# Patient Record
Sex: MALE | Race: WHITE | NOT HISPANIC OR LATINO | Employment: FULL TIME | URBAN - METROPOLITAN AREA
[De-identification: names, ages, dates, MRNs, and addresses within clinical notes are randomized per-mention and may not be internally consistent; named-entity substitution may affect disease eponyms.]

---

## 2023-09-21 ENCOUNTER — HOSPITAL ENCOUNTER (OUTPATIENT)
Dept: DATA CONVERSION | Facility: HOSPITAL | Age: 48
Discharge: HOME | End: 2023-09-21

## 2023-09-21 DIAGNOSIS — E66.9 OBESITY, UNSPECIFIED: ICD-10-CM

## 2023-09-21 DIAGNOSIS — Z12.5 ENCOUNTER FOR SCREENING FOR MALIGNANT NEOPLASM OF PROSTATE: ICD-10-CM

## 2023-09-21 DIAGNOSIS — Z00.00 ENCOUNTER FOR GENERAL ADULT MEDICAL EXAMINATION WITHOUT ABNORMAL FINDINGS: ICD-10-CM

## 2023-09-21 LAB
ALBUMIN SERPL-MCNC: 4.6 GM/DL (ref 3.5–5)
ALBUMIN/GLOB SERPL: 2 RATIO (ref 1.5–3)
ALP BLD-CCNC: 67 U/L (ref 35–125)
ALT SERPL-CCNC: 29 U/L (ref 5–40)
ANION GAP SERPL CALCULATED.3IONS-SCNC: 14 MMOL/L (ref 0–19)
APPEARANCE PLAS: ABNORMAL
AST SERPL-CCNC: 23 U/L (ref 5–40)
BASOPHILS # BLD AUTO: 0.02 K/UL (ref 0–0.22)
BASOPHILS NFR BLD AUTO: 0.3 % (ref 0–1)
BILIRUB SERPL-MCNC: 0.6 MG/DL (ref 0.1–1.2)
BILIRUB UR QL STRIP.AUTO: NEGATIVE
BUN SERPL-MCNC: 19 MG/DL (ref 8–25)
BUN/CREAT SERPL: 19 RATIO (ref 8–21)
CALCIUM SERPL-MCNC: 9.6 MG/DL (ref 8.5–10.4)
CHLORIDE SERPL-SCNC: 104 MMOL/L (ref 97–107)
CHOLEST SERPL-MCNC: 189 MG/DL (ref 133–200)
CHOLEST/HDLC SERPL: 5.1 RATIO
CLARITY UR: CLEAR
CO2 SERPL-SCNC: 23 MMOL/L (ref 24–31)
COLOR SPUN FLD: ABNORMAL
COLOR UR: NORMAL
CREAT SERPL-MCNC: 1 MG/DL (ref 0.4–1.6)
DEPRECATED RDW RBC AUTO: 39.9 FL (ref 37–54)
DIFFERENTIAL METHOD BLD: NORMAL
EOSINOPHIL # BLD AUTO: 0.12 K/UL (ref 0–0.45)
EOSINOPHIL NFR BLD: 1.9 % (ref 0–3)
ERYTHROCYTE [DISTWIDTH] IN BLOOD BY AUTOMATED COUNT: 11.9 % (ref 11.7–15)
FASTING STATUS PATIENT QL REPORTED: ABNORMAL
GFR SERPL CREATININE-BSD FRML MDRD: 92 ML/MIN/1.73 M2
GLOBULIN SER-MCNC: 2.3 G/DL (ref 1.9–3.7)
GLUCOSE SERPL-MCNC: 85 MG/DL (ref 65–99)
GLUCOSE UR STRIP.AUTO-MCNC: NEGATIVE MG/DL
HCT VFR BLD AUTO: 42 % (ref 41–50)
HDLC SERPL-MCNC: 37 MG/DL
HGB BLD-MCNC: 14.2 GM/DL (ref 13.5–16.5)
HGB UR QL: NEGATIVE
IMM GRANULOCYTES # BLD AUTO: 0.03 K/UL (ref 0–0.1)
KETONES UR QL STRIP.AUTO: NEGATIVE
LDLC SERPL CALC-MCNC: 123 MG/DL (ref 65–130)
LEUKOCYTE ESTERASE UR QL STRIP.AUTO: NEGATIVE
LYMPHOCYTES # BLD AUTO: 2.36 K/UL (ref 1.2–3.2)
LYMPHOCYTES NFR BLD MANUAL: 37.9 % (ref 20–40)
MCH RBC QN AUTO: 31.1 PG (ref 26–34)
MCHC RBC AUTO-ENTMCNC: 33.8 % (ref 31–37)
MCV RBC AUTO: 92.1 FL (ref 80–100)
MONOCYTES # BLD AUTO: 0.43 K/UL (ref 0–0.8)
MONOCYTES NFR BLD MANUAL: 6.9 % (ref 0–8)
NEUTROPHILS # BLD AUTO: 3.27 K/UL
NEUTROPHILS # BLD AUTO: 3.27 K/UL (ref 1.8–7.7)
NEUTROPHILS.IMMATURE NFR BLD: 0.5 % (ref 0–1)
NEUTS SEG NFR BLD: 52.5 % (ref 50–70)
NITRITE UR QL STRIP.AUTO: NEGATIVE
NRBC BLD-RTO: 0 /100 WBC
PH UR STRIP.AUTO: 5.5 [PH] (ref 4.6–8)
PLATELET # BLD AUTO: 256 K/UL (ref 150–450)
PMV BLD AUTO: 10.1 CU (ref 7–12.6)
POTASSIUM SERPL-SCNC: 4.1 MMOL/L (ref 3.4–5.1)
PROT SERPL-MCNC: 6.9 G/DL (ref 5.9–7.9)
PROT UR STRIP.AUTO-MCNC: NEGATIVE MG/DL
PSA SERPL-MCNC: 0.3 NG/ML (ref 0–4.1)
RBC # BLD AUTO: 4.56 M/UL (ref 4.5–5.5)
REFLEX MICROSCOPIC (UA): NORMAL
SODIUM SERPL-SCNC: 141 MMOL/L (ref 133–145)
SP GR UR STRIP.AUTO: 1.02 (ref 1–1.03)
TRIGL SERPL-MCNC: 143 MG/DL (ref 40–150)
UROBILINOGEN UR QL STRIP.AUTO: NORMAL MG/DL (ref 0–1)
WBC # BLD AUTO: 6.2 K/UL (ref 4.5–11)

## 2024-07-22 ENCOUNTER — OFFICE VISIT (OUTPATIENT)
Dept: PRIMARY CARE | Facility: CLINIC | Age: 49
End: 2024-07-22
Payer: COMMERCIAL

## 2024-07-22 VITALS
HEIGHT: 73 IN | SYSTOLIC BLOOD PRESSURE: 120 MMHG | OXYGEN SATURATION: 99 % | HEART RATE: 70 BPM | WEIGHT: 217 LBS | DIASTOLIC BLOOD PRESSURE: 80 MMHG | BODY MASS INDEX: 28.76 KG/M2

## 2024-07-22 DIAGNOSIS — R21 RASH AND OTHER NONSPECIFIC SKIN ERUPTION: Primary | ICD-10-CM

## 2024-07-22 PROCEDURE — 99213 OFFICE O/P EST LOW 20 MIN: CPT

## 2024-07-22 PROCEDURE — 3008F BODY MASS INDEX DOCD: CPT

## 2024-07-22 PROCEDURE — 1036F TOBACCO NON-USER: CPT

## 2024-07-22 RX ORDER — CETIRIZINE HYDROCHLORIDE 10 MG/1
10 TABLET ORAL DAILY
Qty: 30 TABLET | Refills: 0 | Status: SHIPPED | OUTPATIENT
Start: 2024-07-22 | End: 2024-08-21

## 2024-07-22 RX ORDER — PREDNISONE 20 MG/1
40 TABLET ORAL DAILY
Qty: 10 TABLET | Refills: 0 | Status: SHIPPED | OUTPATIENT
Start: 2024-07-22 | End: 2024-07-27

## 2024-07-22 ASSESSMENT — PATIENT HEALTH QUESTIONNAIRE - PHQ9
2. FEELING DOWN, DEPRESSED OR HOPELESS: NOT AT ALL
1. LITTLE INTEREST OR PLEASURE IN DOING THINGS: NOT AT ALL
SUM OF ALL RESPONSES TO PHQ9 QUESTIONS 1 AND 2: 0

## 2024-07-22 ASSESSMENT — ENCOUNTER SYMPTOMS
COUGH: 0
DIZZINESS: 0
LIGHT-HEADEDNESS: 0
TROUBLE SWALLOWING: 0
FEVER: 0
SHORTNESS OF BREATH: 0
CHILLS: 0
VOMITING: 0
NAUSEA: 0

## 2024-07-22 ASSESSMENT — LIFESTYLE VARIABLES
HOW OFTEN DO YOU HAVE A DRINK CONTAINING ALCOHOL: NEVER
AUDIT-C TOTAL SCORE: 0
HOW MANY STANDARD DRINKS CONTAINING ALCOHOL DO YOU HAVE ON A TYPICAL DAY: PATIENT DOES NOT DRINK
SKIP TO QUESTIONS 9-10: 1
HOW OFTEN DO YOU HAVE SIX OR MORE DRINKS ON ONE OCCASION: NEVER

## 2024-07-22 ASSESSMENT — PAIN SCALES - GENERAL: PAINLEVEL: 0-NO PAIN

## 2024-07-22 ASSESSMENT — COLUMBIA-SUICIDE SEVERITY RATING SCALE - C-SSRS: 1. IN THE PAST MONTH, HAVE YOU WISHED YOU WERE DEAD OR WISHED YOU COULD GO TO SLEEP AND NOT WAKE UP?: NO

## 2024-07-22 NOTE — PROGRESS NOTES
"Subjective   Patient ID: Balwinder Tomlinson is a 49 y.o. male who presents for Rash (Legs,face and arms /X 2 days /Very itchy /No burning /It is red /Warm to the touch ).    Rash  This is a new problem. The current episode started in the past 7 days. The problem has been gradually worsening since onset. The rash is diffuse. The rash is characterized by itchiness. He was exposed to nothing. Pertinent negatives include no cough, fever, shortness of breath or vomiting. Past treatments include antibiotic cream. The treatment provided mild relief. His past medical history is significant for allergies.   Reports feeling a pinch on his right upper arm. Area became red, swollen and itchy, has since spread.     Review of Systems   Constitutional:  Negative for chills and fever.   HENT:  Negative for sneezing and trouble swallowing.    Respiratory:  Negative for cough and shortness of breath.    Cardiovascular:  Negative for chest pain.   Gastrointestinal:  Negative for nausea and vomiting.   Skin:  Positive for rash.   Neurological:  Negative for dizziness and light-headedness.     Objective   /80   Pulse 70   Ht 1.854 m (6' 1\")   Wt 98.4 kg (217 lb)   SpO2 99%   BMI 28.63 kg/m²     Physical Exam  Vitals and nursing note reviewed.   Constitutional:       General: He is not in acute distress.  HENT:      Mouth/Throat:      Mouth: Mucous membranes are moist.      Pharynx: Oropharynx is clear.   Eyes:      Extraocular Movements: Extraocular movements intact.      Conjunctiva/sclera: Conjunctivae normal.   Cardiovascular:      Rate and Rhythm: Normal rate and regular rhythm.   Pulmonary:      Effort: Pulmonary effort is normal.      Breath sounds: Normal breath sounds.   Musculoskeletal:      Cervical back: Neck supple.   Skin:     Findings: Rash present. Rash is urticarial (Diffuse).          Neurological:      General: No focal deficit present.      Mental Status: He is alert.   Psychiatric:         Mood and Affect: " Mood normal.         Assessment/Plan   Problem List Items Addressed This Visit    None  Visit Diagnoses         Codes    Rash and other nonspecific skin eruption    -  Primary  Acute. Suspect insect bite with systemic inflammatory/allergic response. Low suspicion for tick bite/Lyme disease at this time.   Prednisone as directed. Risks and benefits of medication discussed and prescribed.   May take Zyrtec/Benadryl as needed for itching. Keep affected area clean and dry, may apply warm compresses, antibiotic ointment to affected area.   Follow up if symptoms do not improve within 48-72 hours. Monitor for signs of infection, bullseye rash. Go to ER if condition worsens or becomes life-threatening.  R21    Relevant Medications    predniSONE (Deltasone) 20 mg tablet    cetirizine (ZyrTEC) 10 mg tablet

## 2024-09-25 ENCOUNTER — TELEPHONE (OUTPATIENT)
Dept: PRIMARY CARE | Facility: CLINIC | Age: 49
End: 2024-09-25
Payer: COMMERCIAL

## 2024-09-25 DIAGNOSIS — Z00.00 ROUTINE MEDICAL EXAM: ICD-10-CM

## 2024-09-25 DIAGNOSIS — Z11.59 NEED FOR HEPATITIS C SCREENING TEST: ICD-10-CM

## 2024-09-25 DIAGNOSIS — Z11.4 ENCOUNTER FOR SCREENING FOR HIV: ICD-10-CM

## 2024-09-27 ENCOUNTER — LAB (OUTPATIENT)
Dept: LAB | Facility: LAB | Age: 49
End: 2024-09-27
Payer: COMMERCIAL

## 2024-09-27 DIAGNOSIS — Z00.00 ROUTINE MEDICAL EXAM: ICD-10-CM

## 2024-09-27 DIAGNOSIS — Z11.4 ENCOUNTER FOR SCREENING FOR HIV: ICD-10-CM

## 2024-09-27 DIAGNOSIS — Z11.59 NEED FOR HEPATITIS C SCREENING TEST: ICD-10-CM

## 2024-09-27 LAB
ALBUMIN SERPL BCP-MCNC: 4.6 G/DL (ref 3.4–5)
ALP SERPL-CCNC: 54 U/L (ref 33–120)
ALT SERPL W P-5'-P-CCNC: 24 U/L (ref 10–52)
ANION GAP SERPL CALCULATED.3IONS-SCNC: 9 MMOL/L (ref 10–20)
APPEARANCE UR: CLEAR
AST SERPL W P-5'-P-CCNC: 19 U/L (ref 9–39)
BASOPHILS # BLD AUTO: 0.04 X10*3/UL (ref 0–0.1)
BASOPHILS NFR BLD AUTO: 0.7 %
BILIRUB SERPL-MCNC: 0.7 MG/DL (ref 0–1.2)
BILIRUB UR STRIP.AUTO-MCNC: NEGATIVE MG/DL
BUN SERPL-MCNC: 19 MG/DL (ref 6–23)
CALCIUM SERPL-MCNC: 9.4 MG/DL (ref 8.6–10.3)
CHLORIDE SERPL-SCNC: 103 MMOL/L (ref 98–107)
CHOLEST SERPL-MCNC: 196 MG/DL (ref 0–199)
CHOLEST/HDLC SERPL: 4.5 {RATIO}
CO2 SERPL-SCNC: 30 MMOL/L (ref 21–32)
COLOR UR: NORMAL
CREAT SERPL-MCNC: 0.84 MG/DL (ref 0.5–1.3)
EGFRCR SERPLBLD CKD-EPI 2021: >90 ML/MIN/1.73M*2
EOSINOPHIL # BLD AUTO: 0.19 X10*3/UL (ref 0–0.7)
EOSINOPHIL NFR BLD AUTO: 3.2 %
ERYTHROCYTE [DISTWIDTH] IN BLOOD BY AUTOMATED COUNT: 12 % (ref 11.5–14.5)
GLUCOSE SERPL-MCNC: 84 MG/DL (ref 74–99)
GLUCOSE UR STRIP.AUTO-MCNC: NORMAL MG/DL
HCT VFR BLD AUTO: 41.3 % (ref 41–52)
HCV AB SER QL: NONREACTIVE
HDLC SERPL-MCNC: 43.8 MG/DL
HGB BLD-MCNC: 14.1 G/DL (ref 13.5–17.5)
HIV 1+2 AB+HIV1 P24 AG SERPL QL IA: NONREACTIVE
IMM GRANULOCYTES # BLD AUTO: 0.01 X10*3/UL (ref 0–0.7)
IMM GRANULOCYTES NFR BLD AUTO: 0.2 % (ref 0–0.9)
KETONES UR STRIP.AUTO-MCNC: NEGATIVE MG/DL
LDLC SERPL CALC-MCNC: 125 MG/DL
LEUKOCYTE ESTERASE UR QL STRIP.AUTO: NEGATIVE
LYMPHOCYTES # BLD AUTO: 2.18 X10*3/UL (ref 1.2–4.8)
LYMPHOCYTES NFR BLD AUTO: 36.5 %
MCH RBC QN AUTO: 31.3 PG (ref 26–34)
MCHC RBC AUTO-ENTMCNC: 34.1 G/DL (ref 32–36)
MCV RBC AUTO: 92 FL (ref 80–100)
MONOCYTES # BLD AUTO: 0.41 X10*3/UL (ref 0.1–1)
MONOCYTES NFR BLD AUTO: 6.9 %
NEUTROPHILS # BLD AUTO: 3.14 X10*3/UL (ref 1.2–7.7)
NEUTROPHILS NFR BLD AUTO: 52.5 %
NITRITE UR QL STRIP.AUTO: NEGATIVE
NON HDL CHOLESTEROL: 152 MG/DL (ref 0–149)
NRBC BLD-RTO: 0 /100 WBCS (ref 0–0)
PH UR STRIP.AUTO: 6.5 [PH]
PLATELET # BLD AUTO: 238 X10*3/UL (ref 150–450)
POTASSIUM SERPL-SCNC: 4.1 MMOL/L (ref 3.5–5.3)
PROT SERPL-MCNC: 6.6 G/DL (ref 6.4–8.2)
PROT UR STRIP.AUTO-MCNC: NEGATIVE MG/DL
RBC # BLD AUTO: 4.51 X10*6/UL (ref 4.5–5.9)
RBC # UR STRIP.AUTO: NEGATIVE /UL
SODIUM SERPL-SCNC: 138 MMOL/L (ref 136–145)
SP GR UR STRIP.AUTO: 1.02
TRIGL SERPL-MCNC: 134 MG/DL (ref 0–149)
UROBILINOGEN UR STRIP.AUTO-MCNC: NORMAL MG/DL
VLDL: 27 MG/DL (ref 0–40)
WBC # BLD AUTO: 6 X10*3/UL (ref 4.4–11.3)

## 2024-09-27 PROCEDURE — 85025 COMPLETE CBC W/AUTO DIFF WBC: CPT

## 2024-09-27 PROCEDURE — 86803 HEPATITIS C AB TEST: CPT

## 2024-09-27 PROCEDURE — 80053 COMPREHEN METABOLIC PANEL: CPT

## 2024-09-27 PROCEDURE — 81003 URINALYSIS AUTO W/O SCOPE: CPT

## 2024-09-27 PROCEDURE — 80061 LIPID PANEL: CPT

## 2024-09-27 PROCEDURE — 87389 HIV-1 AG W/HIV-1&-2 AB AG IA: CPT

## 2024-09-27 PROCEDURE — 36415 COLL VENOUS BLD VENIPUNCTURE: CPT

## 2024-10-04 ENCOUNTER — APPOINTMENT (OUTPATIENT)
Dept: PRIMARY CARE | Facility: CLINIC | Age: 49
End: 2024-10-04

## 2024-10-04 ENCOUNTER — TELEPHONE (OUTPATIENT)
Dept: PRIMARY CARE | Facility: CLINIC | Age: 49
End: 2024-10-04

## 2024-10-04 VITALS
WEIGHT: 219 LBS | HEIGHT: 73 IN | DIASTOLIC BLOOD PRESSURE: 83 MMHG | HEART RATE: 67 BPM | TEMPERATURE: 97.6 F | OXYGEN SATURATION: 96 % | SYSTOLIC BLOOD PRESSURE: 123 MMHG | BODY MASS INDEX: 29.03 KG/M2

## 2024-10-04 DIAGNOSIS — Z00.00 WELL ADULT EXAM: ICD-10-CM

## 2024-10-04 DIAGNOSIS — S76.311A STRAIN OF RIGHT HAMSTRING MUSCLE, INITIAL ENCOUNTER: ICD-10-CM

## 2024-10-04 DIAGNOSIS — Z00.00 WELL ADULT EXAM: Primary | ICD-10-CM

## 2024-10-04 PROBLEM — R51.9 HEADACHE: Status: RESOLVED | Noted: 2019-10-07 | Resolved: 2024-10-04

## 2024-10-04 PROBLEM — E66.9 OBESITY: Status: RESOLVED | Noted: 2021-03-26 | Resolved: 2024-10-04

## 2024-10-04 PROBLEM — R53.83 FATIGUE: Status: RESOLVED | Noted: 2021-03-26 | Resolved: 2024-10-04

## 2024-10-04 PROBLEM — Z86.39 H/O: OBESITY: Status: ACTIVE | Noted: 2019-01-02

## 2024-10-04 PROBLEM — J01.00 ACUTE MAXILLARY SINUSITIS: Status: RESOLVED | Noted: 2019-10-07 | Resolved: 2024-10-04

## 2024-10-04 PROCEDURE — 3008F BODY MASS INDEX DOCD: CPT | Performed by: FAMILY MEDICINE

## 2024-10-04 PROCEDURE — 99396 PREV VISIT EST AGE 40-64: CPT | Performed by: FAMILY MEDICINE

## 2024-10-04 ASSESSMENT — COLUMBIA-SUICIDE SEVERITY RATING SCALE - C-SSRS
1. IN THE PAST MONTH, HAVE YOU WISHED YOU WERE DEAD OR WISHED YOU COULD GO TO SLEEP AND NOT WAKE UP?: NO
6. HAVE YOU EVER DONE ANYTHING, STARTED TO DO ANYTHING, OR PREPARED TO DO ANYTHING TO END YOUR LIFE?: NO
2. HAVE YOU ACTUALLY HAD ANY THOUGHTS OF KILLING YOURSELF?: NO

## 2024-10-04 ASSESSMENT — PATIENT HEALTH QUESTIONNAIRE - PHQ9
SUM OF ALL RESPONSES TO PHQ9 QUESTIONS 1 AND 2: 0
2. FEELING DOWN, DEPRESSED OR HOPELESS: NOT AT ALL
1. LITTLE INTEREST OR PLEASURE IN DOING THINGS: NOT AT ALL

## 2024-10-04 NOTE — PROGRESS NOTES
"Subjective   Patient ID: Balwinder Tomlinson is a 49 y.o. male who presents for Annual Exam (EKG 1-18-19/Pt pulled right leg hamstring x 1 month ).    HPI  Patient Care Team:  Crystal Wood MD as PCP - General (Family Medicine)  Crystal Wood MD as PCP - [facility] PCP  Crystal Wood MD as Primary Care Provider    Balwinder Tomlinson is seen for comprehensive physical exam.  PMH, PSH, family history and social history were reviewed and updated.  Right hamstring - strained 2 weeks ago playing soccer, gets twinge whenever he starts exercising again, no bruising    Review of Systems   Constitutional:  Negative for chills, fever and unexpected weight change.   HENT:  Negative for congestion, hearing loss, postnasal drip, sore throat and trouble swallowing.    Eyes:  Negative for visual disturbance.   Respiratory:  Negative for cough and shortness of breath.    Cardiovascular:  Negative for chest pain and leg swelling.   Gastrointestinal:  Negative for abdominal pain, blood in stool, nausea and vomiting.   Genitourinary:  Negative for difficulty urinating and hematuria.   Musculoskeletal:  Negative for arthralgias and myalgias.   Skin:  Negative for rash.   Neurological:  Negative for weakness and numbness.   Psychiatric/Behavioral:  Negative for dysphoric mood. The patient is not nervous/anxious.    All other systems reviewed and are negative.      Objective   /83 (BP Location: Left arm, Patient Position: Sitting, BP Cuff Size: Adult)   Pulse 67   Temp 36.4 °C (97.6 °F) (Temporal)   Ht 1.854 m (6' 1\")   Wt 99.3 kg (219 lb)   SpO2 96%   BMI 28.89 kg/m²     Physical Exam  Vitals and nursing note reviewed.   Constitutional:       Appearance: Normal appearance.   HENT:      Head: Normocephalic.      Right Ear: Tympanic membrane and ear canal normal.      Left Ear: Tympanic membrane and ear canal normal.      Nose: Nose normal.      Mouth/Throat:      Mouth: Mucous membranes are moist.   Eyes:      " Extraocular Movements: Extraocular movements intact.      Pupils: Pupils are equal, round, and reactive to light.   Neck:      Vascular: No carotid bruit.   Cardiovascular:      Rate and Rhythm: Normal rate and regular rhythm.   Pulmonary:      Effort: Pulmonary effort is normal.      Breath sounds: Normal breath sounds.   Abdominal:      General: Abdomen is flat. Bowel sounds are normal.      Palpations: Abdomen is soft.      Tenderness: There is no abdominal tenderness.   Musculoskeletal:         General: Normal range of motion.      Cervical back: Normal range of motion.   Lymphadenopathy:      Cervical: No cervical adenopathy.   Skin:     General: Skin is warm.      Findings: No rash.   Neurological:      General: No focal deficit present.      Mental Status: He is alert.   Psychiatric:         Mood and Affect: Mood normal.         Assessment/Plan   Assessment & Plan  Well adult exam  Preventative measures discussed in detail.  Immunizations reviewed and discussed.  Reviewed labs with patient.         Strain of right hamstring muscle, initial encounter  Recommend referral to PT  Orders:    Referral to Physical Therapy; Future        Follow up  1 year , sooner with any problems or concerns.

## 2024-10-13 ASSESSMENT — ENCOUNTER SYMPTOMS
NERVOUS/ANXIOUS: 0
NUMBNESS: 0
WEAKNESS: 0
UNEXPECTED WEIGHT CHANGE: 0
HEMATURIA: 0
SHORTNESS OF BREATH: 0
TROUBLE SWALLOWING: 0
MYALGIAS: 0
VOMITING: 0
SORE THROAT: 0
BLOOD IN STOOL: 0
ARTHRALGIAS: 0
ABDOMINAL PAIN: 0
CHILLS: 0
DYSPHORIC MOOD: 0
FEVER: 0
COUGH: 0
NAUSEA: 0
DIFFICULTY URINATING: 0

## 2024-11-01 ENCOUNTER — EVALUATION (OUTPATIENT)
Dept: PHYSICAL THERAPY | Facility: CLINIC | Age: 49
End: 2024-11-01
Payer: COMMERCIAL

## 2024-11-01 DIAGNOSIS — S76.311D STRAIN OF RIGHT HAMSTRING MUSCLE, SUBSEQUENT ENCOUNTER: Primary | ICD-10-CM

## 2024-11-01 PROBLEM — S76.311A RIGHT HAMSTRING MUSCLE STRAIN: Status: ACTIVE | Noted: 2024-11-01

## 2024-11-01 PROCEDURE — 97140 MANUAL THERAPY 1/> REGIONS: CPT | Mod: GP

## 2024-11-01 PROCEDURE — 97110 THERAPEUTIC EXERCISES: CPT | Mod: GP

## 2024-11-01 PROCEDURE — 97161 PT EVAL LOW COMPLEX 20 MIN: CPT | Mod: GP

## 2024-11-01 ASSESSMENT — PAIN - FUNCTIONAL ASSESSMENT: PAIN_FUNCTIONAL_ASSESSMENT: 0-10

## 2024-11-01 ASSESSMENT — PAIN SCALES - GENERAL: PAINLEVEL_OUTOF10: 5 - MODERATE PAIN

## 2024-11-01 ASSESSMENT — ENCOUNTER SYMPTOMS
QUALITY: DISCOMFORT
ALLEVIATING FACTORS: REST
PAIN SCALE AT HIGHEST: 5
QUALITY: DULL ACHE
QUALITY: TIGHT

## 2024-11-15 ENCOUNTER — TREATMENT (OUTPATIENT)
Dept: PHYSICAL THERAPY | Facility: CLINIC | Age: 49
End: 2024-11-15
Payer: COMMERCIAL

## 2024-11-15 DIAGNOSIS — S76.311D STRAIN OF RIGHT HAMSTRING MUSCLE, SUBSEQUENT ENCOUNTER: ICD-10-CM

## 2024-11-15 PROCEDURE — 97140 MANUAL THERAPY 1/> REGIONS: CPT | Mod: GP,CQ

## 2024-11-15 PROCEDURE — 97110 THERAPEUTIC EXERCISES: CPT | Mod: GP,CQ

## 2024-11-15 ASSESSMENT — PAIN SCALES - GENERAL: PAINLEVEL_OUTOF10: 5 - MODERATE PAIN

## 2024-11-15 ASSESSMENT — PAIN - FUNCTIONAL ASSESSMENT: PAIN_FUNCTIONAL_ASSESSMENT: 0-10

## 2024-11-15 NOTE — PROGRESS NOTES
Physical Therapy Treatment    Patient Name: Balwinder Tomlinson  MRN: [id number]  Encounter date: 11/15/2024 PT Received On: 11/15/24  Time Calculation  Start Time: 0740  Stop Time: 0830  Time Calculation (min): 50 min  PT Modalities Time Entry  Ultrasound Time Entry: 10  PT Therapeutic Procedures Time Entry  Manual Therapy Time Entry: 10  Therapeutic Exercise Time Entry: 25    Visit # 2 of 10  Visits/Dates Authorized: 10/31/24:  [facility] - NO AUTH / $3200 DEDUCT not met / $4000 OOP not met / 90% coins / [place] visits / Per Angela @ [facility] [place], ref: [id number] / ds     Current Problem:   Problem List Items Addressed This Visit             ICD-10-CM    Right hamstring muscle strain S76.311A     Surgery:   Surgery date:     Precautions:           Subjective  After sitting too long can be sore.  Doing ok with the therex.  Sit a lot at work and gets sore after a while.  General:        Pre-Treatment Symptoms:   Pain Assessment: 0-10  0-10 (Numeric) Pain Score: 5 - Moderate pain    Objective   Findings: soreness in mid belly of hamstring  Vitals:             Treatments:      herapeutic Exercise 17320:   Tbal curl in  5x 15 sec isometric hamstring straight leg bridge on ball  Seated hamstring isometric leg over ball 5x 15 sec  Supine 90/90 nerve glide and active HS stretch x15  Supine, sidelying, and prone SLR x20x   DL bridge x10 patient doesn't feel any work load  Prone knee flexion 2x10  Seated LAQ / nerve glide with tennis ball for self-MFR x10          HEP (instruction to avoid end-range long duration HS stretching at this time)     Neuromuscular Re-Ed [id number]:     Manual Therapy 47583:   IASTM and MFR - R hamstring and lateral calf    Modalities:   Ultrasound: Continuous at 100%, 1mHz at 1.5w/cm2, applied to mid hamstring, in Prone, for 10 minutes     HEP / Access Codes:   Access Code: [email or web address]  Date: 11/01/2024  - Supine 90/90 Sciatic Nerve Glide with Knee Flexion/Extension  - 1 x daily - 2 sets - 15 reps  - Supine Active Straight Leg  Raise  - 1 x daily - 2 sets - 10-15 reps  - Supine Bridge  - 1 x daily - 2 sets - 10-15 reps  - Sidelying Hip Abduction  - 1 x daily - 2 sets - 10-15 reps  - Prone Knee Flexion AROM  - 1 x daily - 2 sets - 15 reps  - Prone Hip Extension  - 1 x daily - 2 sets - 10-15 reps  - Seated Long Arc Quad (with tennis ball for MFR)  - 1 x daily - 2 sets - 10 reps          HEP / Access Codes:     Assessment:    Did well with isometrics today no increase in pain.  Open to try dry needling as well.  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 Patient will display full and pain-free HS flexibility and MMT strength comparable to uninvolved extremity.  2. Patient will report 0/10 HS pain with daily activity and soccer-related activities.   3. Patient will display sciatic neural tension WNL.   4. Patient will score at least 75/80 on LEFS to indicate minimal LE impairment.

## 2024-11-29 ENCOUNTER — TREATMENT (OUTPATIENT)
Dept: PHYSICAL THERAPY | Facility: CLINIC | Age: 49
End: 2024-11-29
Payer: COMMERCIAL

## 2024-11-29 DIAGNOSIS — S76.311D STRAIN OF RIGHT HAMSTRING MUSCLE, SUBSEQUENT ENCOUNTER: ICD-10-CM

## 2024-11-29 PROCEDURE — 97110 THERAPEUTIC EXERCISES: CPT | Mod: GP,CQ

## 2024-11-29 PROCEDURE — 97140 MANUAL THERAPY 1/> REGIONS: CPT | Mod: GP,CQ

## 2024-11-29 PROCEDURE — 97014 ELECTRIC STIMULATION THERAPY: CPT | Mod: GP,CQ

## 2024-11-29 ASSESSMENT — PAIN - FUNCTIONAL ASSESSMENT: PAIN_FUNCTIONAL_ASSESSMENT: 0-10

## 2024-11-29 ASSESSMENT — PAIN SCALES - GENERAL: PAINLEVEL_OUTOF10: 0 - NO PAIN

## 2024-11-29 NOTE — PROGRESS NOTES
Physical Therapy Treatment    Patient Name: Balwinder Tomlinson  MRN: [id number]  Encounter date: 11/29/2024    Time Calculation  Start Time: 0739  Stop Time: 0825  Time Calculation (min): 46 min  PT Modalities Time Entry  E-Stim (Unattended) Time Entry: 12  PT Therapeutic Procedures Time Entry  Manual Therapy Time Entry: 12  Therapeutic Exercise Time Entry: 18    Visit # 3 of 10  Visits/Dates Authorized: 10/31/24:  [facility] - NO AUTH / $3200 DEDUCT not met / $4000 OOP not met / 90% coins / [place] visits / Per Angela @ [facility] [place], ref: [id number] / ds     Current Problem:   Problem List Items Addressed This Visit             ICD-10-CM    Right hamstring muscle strain S76.311A       Surgery:   Surgery date:     Precautions:           Subjective    General:    Patient states he feels warmed up after shoveling snow off driveway. Patient reports the R hamstring stiffens up when at rest. Patient is still playing soccer, feels fine when playing.     Pre-Treatment Symptoms:   Pain Assessment: 0-10  0-10 (Numeric) Pain Score: 0 - No pain (tightness)    Objective   Findings: soreness in mid belly of hamstring  Vitals:             Treatments:      Therapeutic Exercise 77120:   Shuttle leg press DL # 2x12 , SL 62# 2x12 R/L  Shuttle hip extension 12.5# 2x12 R/L   Therapist resist hamstring isometrics, multi angle  Therapist manual hamstring stretch , pt in supine    Deferred:  Tball curl in  5x 15 sec isometric hamstring straight leg bridge on ball  Seated hamstring isometric leg over ball 5x 15 sec  Supine 90/90 nerve glide and active HS stretch x15  Supine, sidelying, and prone SLR x20x   DL bridge x10 patient doesn't feel any work load  Prone knee flexion 2x10  Seated LAQ / nerve glide with tennis ball for self-MFR x10  HEP (instruction to avoid end-range long duration HS stretching at this time)     Neuromuscular Re-Ed [id number]:     Manual Therapy 28555:   IASTM and MFR - R hamstring and lateral calf , patient in prone    Modalities:    Unattended e-stim: Russian: applied to R medial/lateral hamstring, Intensity to tolerance, 5 seconds on, 5 seconds off, applied for 12 minutes, in Prone     Deferred:  Ultrasound: Continuous at 100%, 1mHz at 1.5w/cm2, applied to mid hamstring, in Prone, for 10 minutes     HEP / Access Codes:   Access Code: [email or web address]  Date: 11/01/2024  - Supine 90/90 Sciatic Nerve Glide with Knee Flexion/Extension  - 1 x daily - 2 sets - 15 reps  - Supine Active Straight Leg Raise  - 1 x daily - 2 sets - 10-15 reps  - Supine Bridge  - 1 x daily - 2 sets - 10-15 reps  - Sidelying Hip Abduction  - 1 x daily - 2 sets - 10-15 reps  - Prone Knee Flexion AROM  - 1 x daily - 2 sets - 15 reps  - Prone Hip Extension  - 1 x daily - 2 sets - 10-15 reps  - Seated Long Arc Quad (with tennis ball for MFR)  - 1 x daily - 2 sets - 10 reps    HEP / Access Codes:     Assessment:    Patient tolerated addition of leg press today with only complaint being tightness in R hamstring. Patient trialed French estim at end of session, reducing tightness in R lateral hamstring. Patient encouraged to continue with HEP compliance.     Post-Treatment Symptoms:   Response to Interventions: Relief    Plan:    Progress to LE strength as pain decreases    Goals:   Active       PT Problem       LTG       Start:  11/01/24    Expected End:  01/24/25       1. Patient will display full and pain-free HS flexibility and MMT strength comparable to uninvolved extremity.  2. Patient will report 0/10 HS pain with daily activity and soccer-related activities.   3. Patient will display sciatic neural tension WNL.   4. Patient will score at least 75/80 on LEFS to indicate minimal LE impairment.

## 2024-12-13 ENCOUNTER — TREATMENT (OUTPATIENT)
Dept: PHYSICAL THERAPY | Facility: CLINIC | Age: 49
End: 2024-12-13
Payer: COMMERCIAL

## 2024-12-13 DIAGNOSIS — S76.311D STRAIN OF RIGHT HAMSTRING MUSCLE, SUBSEQUENT ENCOUNTER: ICD-10-CM

## 2024-12-13 PROCEDURE — 97110 THERAPEUTIC EXERCISES: CPT | Mod: GP,CQ

## 2024-12-13 ASSESSMENT — PAIN SCALES - GENERAL: PAINLEVEL_OUTOF10: 0 - NO PAIN

## 2024-12-13 ASSESSMENT — PAIN - FUNCTIONAL ASSESSMENT: PAIN_FUNCTIONAL_ASSESSMENT: 0-10

## 2024-12-13 NOTE — PROGRESS NOTES
"Physical Therapy Treatment / Discharge Summary    Patient Name: Balwinder Tomlinson  MRN: [id number]  Encounter date: 12/13/2024    Time Calculation  Start Time: 0732  Stop Time: 0813  Time Calculation (min): 41 min  PT Therapeutic Procedures Time Entry  Therapeutic Exercise Time Entry: 40    Visit # 4 of 10  Visits/Dates Authorized: 10/31/24:  [facility] - NO AUTH / $3200 DEDUCT not met / $4000 OOP not met / 90% coins / [place] visits / Per Angela @ [facility], ref: [id number] / ds     Current Problem:   Problem List Items Addressed This Visit             ICD-10-CM    Right hamstring muscle strain S76.311A     Precautions:    NA       Subjective    General:    Patient states he feels the R hamstring is getting better, not knotting up like it did in past. Patient reports he played soccer last night, feels good when playing, wears compression sleeve as precaution.     Pre-Treatment Symptoms:   Pain Assessment: 0-10  0-10 (Numeric) Pain Score: 0 - No pain (minimal tightness R hamstring)    Objective   Findings:    soreness in mid belly of hamstring     Other Measures  Lower Extremity Funtional Score (LEFS): 75/80    Treatments:      Therapeutic Exercise 19111:   Scifit L5 5 min  TB SS blue 2 laps   TB monster walk f/b blue 2 laps  Leg Curl DL 85# 2x12  Leg Curl SL eccentrics 55# 2x10 (3-5\" raise)  Seated hamstring isometrics with heavy purple 10x5\" holds   Supine 90/90 nerve glide and active HS stretch x15  Tibialis raises 2x20  RDL with 2 20# DB  HEP updated and reviewed     HEP / Access Codes:   Access Code: [email or web address]  Date: 11/01/2024  - Supine 90/90 Sciatic Nerve Glide with Knee Flexion/Extension  - 1 x daily - 2 sets - 15 reps  - Supine Active Straight Leg Raise  - 1 x daily - 2 sets - 10-15 reps  - Supine Bridge  - 1 x daily - 2 sets - 10-15 reps  - Sidelying Hip Abduction  - 1 x daily - 2 sets - 10-15 reps  - Prone Knee Flexion AROM  - 1 x daily - 2 sets - 15 reps  - Prone Hip Extension  - 1 x daily - 2 sets - 10-15 reps  - Seated " "Long Arc Quad (with tennis ball for MFR)  - 1 x daily - 2 sets - 10 reps    Access Code: [email or web address]  URL: [email or web address]  Date: 12/13/2024  Prepared by: Ronald Espinoza    Exercises  - Seated Hamstring Curl with Anchored Resistance  - 1 x daily - 4-5 x weekly - 2 sets - 10 reps  - Toe Raise With Back Against Wall  - 1 x daily - 4-5 x weekly - 2 sets - 20 reps  - [place] Deadlift With Barbell  - 1 x daily - 4-5 x weekly - 2 sets - 10 reps  - Side Stepping with Resistance at Ankles  - 1 x daily - 4-5 x weekly - 15-20 ft x 4  distance   - Forward and Backward Monster walks  - 1 x daily - 4-5 x weekly - 15-20 ft x 4  distance   - Sit to Stand Without Arm Support  - 1 x daily - 4-5 x weekly - 3 sets - 10 reps    Assessment: Reviewed by Ronald Land PT   Patient tolerated treatment well and has had reduced tightness in R hamstring since first PT visit. Patient decided today would be his last day of PT, going [place] end of year and states \"too expensive\" to continue therapy into next year. Treatment focused on continued LE strengthening, introduced eccentrics with hamstring. Verbal and/or tactile cues given for proper form with new RDL exercise. Patient encouraged to continue with HEP compliance to maintain current progress, issued additional HEP at end of session along with tbands.    Post-Treatment Symptoms:   Response to Interventions: Other (Comment) (bilateral legs fatigued, minimal tightness R hamstring)    Plan:    discharge    Goals:   Resolved       PT Problem       LTG (Adequate for Discharge)       Start:  11/01/24    Expected End:  01/24/25       1. Patient will display full and pain-free HS flexibility and MMT strength comparable to uninvolved extremity.  2. Patient will report 0/10 HS pain with daily activity and soccer-related activities.   3. Patient will display sciatic neural tension WNL.   4. Patient will score at least 75/80 on LEFS to indicate minimal LE impairment.     "

## 2024-12-27 ENCOUNTER — APPOINTMENT (OUTPATIENT)
Dept: PHYSICAL THERAPY | Facility: CLINIC | Age: 49
End: 2024-12-27
Payer: COMMERCIAL

## 2025-01-22 ENCOUNTER — DOCUMENTATION (OUTPATIENT)
Dept: PHYSICAL THERAPY | Facility: CLINIC | Age: 50
End: 2025-01-22
Payer: COMMERCIAL

## 2025-02-13 ENCOUNTER — TELEPHONE (OUTPATIENT)
Dept: PRIMARY CARE | Facility: CLINIC | Age: 50
End: 2025-02-13
Payer: COMMERCIAL

## 2025-02-13 NOTE — TELEPHONE ENCOUNTER
Patient called and stated he's had pain in his left knee for 6 months, the last few months the pain has gotten worse. He scheduled for 02-24-25 [facility]'s first available. Patient level goes from a 6-8, sometimes gets to a 10 and he can't walk. He wants to see [facility], should he be seen sooner?    Patient can be reached at [phone]

## 2025-02-17 ENCOUNTER — HOSPITAL ENCOUNTER (OUTPATIENT)
Dept: RADIOLOGY | Facility: CLINIC | Age: 50
Discharge: HOME | End: 2025-02-17
Payer: COMMERCIAL

## 2025-02-17 ENCOUNTER — APPOINTMENT (OUTPATIENT)
Dept: PRIMARY CARE | Facility: CLINIC | Age: 50
End: 2025-02-17
Payer: COMMERCIAL

## 2025-02-17 VITALS
HEART RATE: 72 BPM | WEIGHT: 225 LBS | OXYGEN SATURATION: 98 % | RESPIRATION RATE: 18 BRPM | DIASTOLIC BLOOD PRESSURE: 80 MMHG | SYSTOLIC BLOOD PRESSURE: 118 MMHG | HEIGHT: 73 IN | BODY MASS INDEX: 29.82 KG/M2

## 2025-02-17 DIAGNOSIS — M25.562 CHRONIC PAIN OF LEFT KNEE: Primary | ICD-10-CM

## 2025-02-17 DIAGNOSIS — M25.562 CHRONIC PAIN OF LEFT KNEE: ICD-10-CM

## 2025-02-17 DIAGNOSIS — G89.29 CHRONIC PAIN OF LEFT KNEE: ICD-10-CM

## 2025-02-17 DIAGNOSIS — G89.29 CHRONIC PAIN OF LEFT KNEE: Primary | ICD-10-CM

## 2025-02-17 PROCEDURE — 1036F TOBACCO NON-USER: CPT | Performed by: FAMILY MEDICINE

## 2025-02-17 PROCEDURE — 3008F BODY MASS INDEX DOCD: CPT | Performed by: FAMILY MEDICINE

## 2025-02-17 PROCEDURE — 73562 X-RAY EXAM OF KNEE 3: CPT | Mod: LT

## 2025-02-17 PROCEDURE — 99214 OFFICE O/P EST MOD 30 MIN: CPT | Performed by: FAMILY MEDICINE

## 2025-02-17 PROCEDURE — 73562 X-RAY EXAM OF KNEE 3: CPT | Mod: LEFT SIDE | Performed by: RADIOLOGY

## 2025-02-17 ASSESSMENT — COLUMBIA-SUICIDE SEVERITY RATING SCALE - C-SSRS: 1. IN THE PAST MONTH, HAVE YOU WISHED YOU WERE DEAD OR WISHED YOU COULD GO TO SLEEP AND NOT WAKE UP?: NO

## 2025-02-17 ASSESSMENT — PAIN SCALES - GENERAL: PAINLEVEL_OUTOF10: 5

## 2025-02-17 NOTE — PROGRESS NOTES
"Subjective   Patient ID: Balwinder Tomlinson is a 49 y.o. male who presents for Knee Pain (Pt reports L knee pain x few months ago, states that pain keeps getting worse. No visual signs on L knee. Reports 5/10 pain as of this note. Walking and standing exacerbates pain. Pt states that no interventions have alleviated the pain.//- Prev20 due/- Labs 9/27/24/- EKG 1/18/19 due).    HPI  Balwinder presents complaining of left knee pain.  Has been going on for the past several months.  Progressively getting worse.  He has significant discomfort in the medial aspect of his knee.  He is unable to squat down due to this discomfort.  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  It does not give out.  He is unable to twist due to the significant pain.    Review of Systems  All other systems have been reviewed and are negative except as noted in the HPI.       Objective   Vital Signs:  /80   Pulse 72   Resp 18   Ht 1.854 m (6' 1\")   Wt 102 kg (225 lb)   SpO2 98%   BMI 29.69 kg/m²     Physical Exam  Alert.  No acute distress.  Full extension of the left knee.  Limited flexion to 100 degrees due to pain.  Mild effusion noted.  Tenderness to palpation over the medial joint line.  No tenderness over the lateral joint line.  All ligaments are intact and stable.  Severe pain with medial Apley's that reproduced his symptoms.  Negative laterally  Assessment & Plan  Chronic pain of left knee  Medial meniscal tear by exam.  Will obtain x-ray of the left knee to ensure no bony abnormality.  If this is negative we will order MRI of the left knee secondary to the chronicity and inability to fully flex his knee as well as difficulty bearing weight secondary to the pain.  Discussed that he will likely need referral to Ortho.  Orders:    XR knee left 3 views; Future        Follow up PRN, sooner " with any problems or concerns.

## 2025-02-18 DIAGNOSIS — S83.242A TEAR OF MEDIAL MENISCUS OF LEFT KNEE, CURRENT, UNSPECIFIED TEAR TYPE, INITIAL ENCOUNTER: Primary | ICD-10-CM

## 2025-02-24 ENCOUNTER — APPOINTMENT (OUTPATIENT)
Dept: PRIMARY CARE | Facility: CLINIC | Age: 50
End: 2025-02-24
Payer: COMMERCIAL

## 2025-02-27 ENCOUNTER — HOSPITAL ENCOUNTER (OUTPATIENT)
Dept: RADIOLOGY | Facility: HOSPITAL | Age: 50
Discharge: HOME | End: 2025-02-27
Payer: COMMERCIAL

## 2025-02-27 DIAGNOSIS — S83.242A TEAR OF MEDIAL MENISCUS OF LEFT KNEE, CURRENT, UNSPECIFIED TEAR TYPE, INITIAL ENCOUNTER: ICD-10-CM

## 2025-02-27 PROCEDURE — 73721 MRI JNT OF LWR EXTRE W/O DYE: CPT | Mod: LT

## 2025-07-15 ENCOUNTER — HOSPITAL ENCOUNTER (EMERGENCY)
Facility: HOSPITAL | Age: 50
Discharge: HOME | End: 2025-07-16
Attending: STUDENT IN AN ORGANIZED HEALTH CARE EDUCATION/TRAINING PROGRAM
Payer: COMMERCIAL

## 2025-07-15 VITALS
BODY MASS INDEX: 29.8 KG/M2 | HEART RATE: 75 BPM | HEIGHT: 72 IN | TEMPERATURE: 97.4 F | DIASTOLIC BLOOD PRESSURE: 85 MMHG | OXYGEN SATURATION: 95 % | RESPIRATION RATE: 18 BRPM | WEIGHT: 220 LBS | SYSTOLIC BLOOD PRESSURE: 123 MMHG

## 2025-07-15 DIAGNOSIS — M54.41 ACUTE RIGHT-SIDED LOW BACK PAIN WITH RIGHT-SIDED SCIATICA: Primary | ICD-10-CM

## 2025-07-15 DIAGNOSIS — R10.9 FLANK PAIN: ICD-10-CM

## 2025-07-15 LAB
ALBUMIN SERPL BCP-MCNC: 4.4 G/DL (ref 3.4–5)
ALP SERPL-CCNC: 61 U/L (ref 33–120)
ALT SERPL W P-5'-P-CCNC: 22 U/L (ref 10–52)
ANION GAP SERPL CALCULATED.3IONS-SCNC: 16 MMOL/L (ref 10–20)
AST SERPL W P-5'-P-CCNC: 24 U/L (ref 9–39)
BASOPHILS # BLD AUTO: 0.03 X10*3/UL (ref 0–0.1)
BASOPHILS NFR BLD AUTO: 0.4 %
BILIRUB SERPL-MCNC: 0.6 MG/DL (ref 0–1.2)
BUN SERPL-MCNC: 25 MG/DL (ref 6–23)
CALCIUM SERPL-MCNC: 9.4 MG/DL (ref 8.6–10.3)
CHLORIDE SERPL-SCNC: 105 MMOL/L (ref 98–107)
CK SERPL-CCNC: 434 U/L (ref 0–325)
CO2 SERPL-SCNC: 20 MMOL/L (ref 21–32)
CREAT SERPL-MCNC: 1.26 MG/DL (ref 0.5–1.3)
EGFRCR SERPLBLD CKD-EPI 2021: 69 ML/MIN/1.73M*2
EOSINOPHIL # BLD AUTO: 0.11 X10*3/UL (ref 0–0.7)
EOSINOPHIL NFR BLD AUTO: 1.4 %
ERYTHROCYTE [DISTWIDTH] IN BLOOD BY AUTOMATED COUNT: 12 % (ref 11.5–14.5)
GLUCOSE SERPL-MCNC: 128 MG/DL (ref 74–99)
HCT VFR BLD AUTO: 38.2 % (ref 41–52)
HGB BLD-MCNC: 13.3 G/DL (ref 13.5–17.5)
IMM GRANULOCYTES # BLD AUTO: 0.09 X10*3/UL (ref 0–0.7)
IMM GRANULOCYTES NFR BLD AUTO: 1.1 % (ref 0–0.9)
LYMPHOCYTES # BLD AUTO: 1.75 X10*3/UL (ref 1.2–4.8)
LYMPHOCYTES NFR BLD AUTO: 21.7 %
MCH RBC QN AUTO: 31.4 PG (ref 26–34)
MCHC RBC AUTO-ENTMCNC: 34.8 G/DL (ref 32–36)
MCV RBC AUTO: 90 FL (ref 80–100)
MONOCYTES # BLD AUTO: 0.52 X10*3/UL (ref 0.1–1)
MONOCYTES NFR BLD AUTO: 6.5 %
NEUTROPHILS # BLD AUTO: 5.55 X10*3/UL (ref 1.2–7.7)
NEUTROPHILS NFR BLD AUTO: 68.9 %
NRBC BLD-RTO: 0 /100 WBCS (ref 0–0)
PLATELET # BLD AUTO: 238 X10*3/UL (ref 150–450)
POTASSIUM SERPL-SCNC: 3.4 MMOL/L (ref 3.5–5.3)
PROT SERPL-MCNC: 7 G/DL (ref 6.4–8.2)
RBC # BLD AUTO: 4.23 X10*6/UL (ref 4.5–5.9)
SODIUM SERPL-SCNC: 138 MMOL/L (ref 136–145)
WBC # BLD AUTO: 8.1 X10*3/UL (ref 4.4–11.3)

## 2025-07-15 PROCEDURE — 2500000004 HC RX 250 GENERAL PHARMACY W/ HCPCS (ALT 636 FOR OP/ED): Performed by: STUDENT IN AN ORGANIZED HEALTH CARE EDUCATION/TRAINING PROGRAM

## 2025-07-15 PROCEDURE — 82550 ASSAY OF CK (CPK): CPT | Performed by: STUDENT IN AN ORGANIZED HEALTH CARE EDUCATION/TRAINING PROGRAM

## 2025-07-15 PROCEDURE — 96374 THER/PROPH/DIAG INJ IV PUSH: CPT

## 2025-07-15 PROCEDURE — 80053 COMPREHEN METABOLIC PANEL: CPT | Performed by: STUDENT IN AN ORGANIZED HEALTH CARE EDUCATION/TRAINING PROGRAM

## 2025-07-15 PROCEDURE — 85025 COMPLETE CBC W/AUTO DIFF WBC: CPT | Performed by: STUDENT IN AN ORGANIZED HEALTH CARE EDUCATION/TRAINING PROGRAM

## 2025-07-15 PROCEDURE — 36415 COLL VENOUS BLD VENIPUNCTURE: CPT | Performed by: STUDENT IN AN ORGANIZED HEALTH CARE EDUCATION/TRAINING PROGRAM

## 2025-07-15 PROCEDURE — 2500000005 HC RX 250 GENERAL PHARMACY W/O HCPCS: Performed by: STUDENT IN AN ORGANIZED HEALTH CARE EDUCATION/TRAINING PROGRAM

## 2025-07-15 PROCEDURE — 99284 EMERGENCY DEPT VISIT MOD MDM: CPT | Mod: 25 | Performed by: STUDENT IN AN ORGANIZED HEALTH CARE EDUCATION/TRAINING PROGRAM

## 2025-07-15 PROCEDURE — 96375 TX/PRO/DX INJ NEW DRUG ADDON: CPT

## 2025-07-15 PROCEDURE — 96361 HYDRATE IV INFUSION ADD-ON: CPT

## 2025-07-15 RX ORDER — DEXAMETHASONE SODIUM PHOSPHATE 10 MG/ML
10 INJECTION INTRAMUSCULAR; INTRAVENOUS ONCE
Status: COMPLETED | OUTPATIENT
Start: 2025-07-15 | End: 2025-07-15

## 2025-07-15 RX ORDER — ORPHENADRINE CITRATE 30 MG/ML
60 INJECTION INTRAMUSCULAR; INTRAVENOUS ONCE
Status: COMPLETED | OUTPATIENT
Start: 2025-07-15 | End: 2025-07-15

## 2025-07-15 RX ORDER — LIDOCAINE 560 MG/1
1 PATCH PERCUTANEOUS; TOPICAL; TRANSDERMAL ONCE
Status: DISCONTINUED | OUTPATIENT
Start: 2025-07-15 | End: 2025-07-16 | Stop reason: HOSPADM

## 2025-07-15 RX ORDER — KETOROLAC TROMETHAMINE 15 MG/ML
15 INJECTION, SOLUTION INTRAMUSCULAR; INTRAVENOUS ONCE
Status: COMPLETED | OUTPATIENT
Start: 2025-07-15 | End: 2025-07-15

## 2025-07-15 RX ADMIN — ORPHENADRINE CITRATE 60 MG: 30 INJECTION, SOLUTION INTRAMUSCULAR; INTRAVENOUS at 23:04

## 2025-07-15 RX ADMIN — LIDOCAINE 4% 1 PATCH: 40 PATCH TOPICAL at 23:04

## 2025-07-15 RX ADMIN — SODIUM CHLORIDE 1000 ML: 900 INJECTION, SOLUTION INTRAVENOUS at 23:02

## 2025-07-15 RX ADMIN — DEXAMETHASONE SODIUM PHOSPHATE 10 MG: 10 INJECTION INTRAMUSCULAR; INTRAVENOUS at 23:04

## 2025-07-15 RX ADMIN — KETOROLAC TROMETHAMINE 15 MG: 15 INJECTION, SOLUTION INTRAMUSCULAR; INTRAVENOUS at 23:04

## 2025-07-15 ASSESSMENT — PAIN - FUNCTIONAL ASSESSMENT: PAIN_FUNCTIONAL_ASSESSMENT: 0-10

## 2025-07-15 ASSESSMENT — PAIN DESCRIPTION - FREQUENCY: FREQUENCY: CONSTANT/CONTINUOUS

## 2025-07-15 ASSESSMENT — PAIN SCALES - GENERAL: PAINLEVEL_OUTOF10: 10 - WORST POSSIBLE PAIN

## 2025-07-15 ASSESSMENT — PAIN DESCRIPTION - ORIENTATION: ORIENTATION: RIGHT

## 2025-07-15 ASSESSMENT — PAIN DESCRIPTION - ONSET: ONSET: ONGOING

## 2025-07-15 ASSESSMENT — PAIN DESCRIPTION - LOCATION: LOCATION: BACK

## 2025-07-15 ASSESSMENT — PAIN DESCRIPTION - PROGRESSION: CLINICAL_PROGRESSION: GRADUALLY WORSENING

## 2025-07-15 ASSESSMENT — PAIN DESCRIPTION - DESCRIPTORS: DESCRIPTORS: SHOOTING;SHARP

## 2025-07-15 ASSESSMENT — PAIN DESCRIPTION - PAIN TYPE: TYPE: ACUTE PAIN

## 2025-07-16 ENCOUNTER — APPOINTMENT (OUTPATIENT)
Dept: RADIOLOGY | Facility: HOSPITAL | Age: 50
End: 2025-07-16
Payer: COMMERCIAL

## 2025-07-16 PROCEDURE — 96375 TX/PRO/DX INJ NEW DRUG ADDON: CPT

## 2025-07-16 PROCEDURE — 74176 CT ABD & PELVIS W/O CONTRAST: CPT

## 2025-07-16 PROCEDURE — 2500000004 HC RX 250 GENERAL PHARMACY W/ HCPCS (ALT 636 FOR OP/ED): Mod: JZ | Performed by: PHYSICIAN ASSISTANT

## 2025-07-16 PROCEDURE — 2500000004 HC RX 250 GENERAL PHARMACY W/ HCPCS (ALT 636 FOR OP/ED): Mod: JZ | Performed by: STUDENT IN AN ORGANIZED HEALTH CARE EDUCATION/TRAINING PROGRAM

## 2025-07-16 PROCEDURE — 2500000001 HC RX 250 WO HCPCS SELF ADMINISTERED DRUGS (ALT 637 FOR MEDICARE OP): Performed by: STUDENT IN AN ORGANIZED HEALTH CARE EDUCATION/TRAINING PROGRAM

## 2025-07-16 PROCEDURE — 96376 TX/PRO/DX INJ SAME DRUG ADON: CPT

## 2025-07-16 PROCEDURE — 74176 CT ABD & PELVIS W/O CONTRAST: CPT | Performed by: STUDENT IN AN ORGANIZED HEALTH CARE EDUCATION/TRAINING PROGRAM

## 2025-07-16 RX ORDER — LIDOCAINE 50 MG/G
1 PATCH TOPICAL DAILY
Qty: 5 PATCH | Refills: 0 | Status: SHIPPED | OUTPATIENT
Start: 2025-07-16

## 2025-07-16 RX ORDER — HYDROMORPHONE HYDROCHLORIDE 1 MG/ML
1 INJECTION, SOLUTION INTRAMUSCULAR; INTRAVENOUS; SUBCUTANEOUS ONCE
Status: COMPLETED | OUTPATIENT
Start: 2025-07-16 | End: 2025-07-16

## 2025-07-16 RX ORDER — HYDROMORPHONE HYDROCHLORIDE 1 MG/ML
1 INJECTION, SOLUTION INTRAMUSCULAR; INTRAVENOUS; SUBCUTANEOUS ONCE
Status: DISCONTINUED | OUTPATIENT
Start: 2025-07-16 | End: 2025-07-16

## 2025-07-16 RX ORDER — KETOROLAC TROMETHAMINE 15 MG/ML
15 INJECTION, SOLUTION INTRAMUSCULAR; INTRAVENOUS ONCE
Status: COMPLETED | OUTPATIENT
Start: 2025-07-16 | End: 2025-07-16

## 2025-07-16 RX ORDER — PREDNISONE 50 MG/1
50 TABLET ORAL DAILY
Qty: 5 TABLET | Refills: 0 | Status: SHIPPED | OUTPATIENT
Start: 2025-07-16 | End: 2025-07-21

## 2025-07-16 RX ORDER — OXYCODONE AND ACETAMINOPHEN 5; 325 MG/1; MG/1
1 TABLET ORAL ONCE
Refills: 0 | Status: COMPLETED | OUTPATIENT
Start: 2025-07-16 | End: 2025-07-16

## 2025-07-16 RX ORDER — CYCLOBENZAPRINE HCL 10 MG
10 TABLET ORAL 2 TIMES DAILY PRN
Qty: 20 TABLET | Refills: 0 | Status: SHIPPED | OUTPATIENT
Start: 2025-07-16 | End: 2025-07-26

## 2025-07-16 RX ORDER — GABAPENTIN 300 MG/1
300 CAPSULE ORAL ONCE
Status: COMPLETED | OUTPATIENT
Start: 2025-07-16 | End: 2025-07-16

## 2025-07-16 RX ORDER — KETOROLAC TROMETHAMINE 10 MG/1
10 TABLET, FILM COATED ORAL EVERY 6 HOURS PRN
Qty: 20 TABLET | Refills: 0 | Status: SHIPPED | OUTPATIENT
Start: 2025-07-16 | End: 2025-07-21

## 2025-07-16 RX ADMIN — GABAPENTIN 300 MG: 300 CAPSULE ORAL at 02:14

## 2025-07-16 RX ADMIN — HYDROMORPHONE HYDROCHLORIDE 1 MG: 1 INJECTION, SOLUTION INTRAMUSCULAR; INTRAVENOUS; SUBCUTANEOUS at 00:49

## 2025-07-16 RX ADMIN — KETOROLAC TROMETHAMINE 15 MG: 15 INJECTION, SOLUTION INTRAMUSCULAR; INTRAVENOUS at 02:15

## 2025-07-16 RX ADMIN — OXYCODONE HYDROCHLORIDE AND ACETAMINOPHEN 1 TABLET: 5; 325 TABLET ORAL at 02:14

## 2025-07-16 NOTE — DISCHARGE INSTRUCTIONS
Optimal treatment of musculoskeletal back pain includes a combination of medications including lidocaine patch to numb the area, these should be applied for 12 hours and removed for 12 hours before applying the next patch to avoid skin irritation.  Muscle relaxers like orphenadrine can cause weakness and sleepiness, you should not drive, lift, or operate heavy machinery or put yourself in any position where muscle weakness or fatigue could cause harm to yourself or others while taking this medication.  Naproxen can be taken twice per day, you can also take Tylenol for further pain relief.  Use ice to numb the pain and heat to relax the muscles as well as rest to reduce any further strain in the area is much as possible while your body is healing.  Steroids like prednisone may be prescribed in the case of sciatic nerve pain in addition to these other medications.    Return to the ED for any new or worsening symptoms including incontinence of urine or stool, severe weakness in the legs causing inability to walk, numbness between the legs when you wipe after going to the bathroom which could be signs of spinal cord compression and require immediate reassessment by physician in the emergency department to prevent lifelong complications.

## 2025-07-16 NOTE — ED PROVIDER NOTES
HPI   Chief Complaint   Patient presents with    Back Pain     Pt reports during his soccer practice tonight he began experiencing lower back pain into his right hip and down the right leg. This started around 2100. Pt denies trauma or injury. States a hx of ruptured disc of L4+L5 when he was 23 years old       50-year-old male presented emergency department with a chief complaint of right low back pain.  Started after playing soccer this evening.  It did not immediately hurt.  It started to hurt after the game.  Stable to bear weight and ambulate.  No saddle anesthesia or malodorous function.  Prior history of thymectomy discectomy in 2003.  Taken nothing for relief prior to arrival.  Denies any other injury or trauma.  No other complaint.              Patient History   Medical History[1]  Surgical History[2]  Family History[3]  Social History[4]    Physical Exam   ED Triage Vitals [07/15/25 2232]   Temperature Heart Rate Respirations BP   36.3 °C (97.4 °F) 75 18 123/85      Pulse Ox Temp Source Heart Rate Source Patient Position   95 % Tympanic Monitor Lying      BP Location FiO2 (%)     Right arm --       Physical Exam  Vitals and nursing note reviewed.   Constitutional:       Appearance: Normal appearance.   HENT:      Head: Normocephalic.      Nose: Nose normal.      Mouth/Throat:      Mouth: Mucous membranes are moist.   Cardiovascular:      Rate and Rhythm: Normal rate and regular rhythm.      Pulses: Normal pulses.      Heart sounds: Normal heart sounds.   Pulmonary:      Effort: Pulmonary effort is normal.      Breath sounds: Normal breath sounds.   Abdominal:      General: Abdomen is flat.      Palpations: Abdomen is soft.   Musculoskeletal:         General: Normal range of motion.      Cervical back: Normal range of motion.      Comments: Right paraspinal tenderness, full range of motion of lower extremity bilaterally   Skin:     General: Skin is warm.   Neurological:      General: No focal deficit  present.      Mental Status: He is alert and oriented to person, place, and time.   Psychiatric:         Mood and Affect: Mood normal.         Behavior: Behavior normal.           ED Course & MDM   Diagnoses as of 07/16/25 0006   Acute right-sided low back pain with right-sided sciatica   Flank pain                 No data recorded     [place]net[place] Coma Scale Score: 15 (07/15/25 2233 : Christie Huynh, EMT)                           Medical Decision Making  I have seen and evaluated this patient.  The attending physician has also seen and evaluated this patient.  Vital signs, laboratory testing and diagnostic images if applicable have been reviewed.  All laboratory and imaging is interpreted by myself unless otherwise stated.  Radiology studies are also formally interpreted by radiologist.     CBC without significant leukocytosis or anemia, metabolic panel without significant renal impairment or electrolyte abnormality.  CK without significant elevation.  Patient feels somewhat improved.  Not having pain in his groin.  Will send for CT image to ensure no obstructive uropathy pathology.  Anticipate discharge if this is within normal limit.      Labs Reviewed   CBC WITH AUTO DIFFERENTIAL - Abnormal       Result Value    WBC 8.1      nRBC 0.0      RBC 4.23 (*)     Hemoglobin 13.3 (*)     Hematocrit 38.2 (*)     MCV 90      MCH 31.4      MCHC 34.8      RDW 12.0      Platelets 238      Neutrophils % 68.9      Immature Granulocytes %, Automated 1.1 (*)     Lymphocytes % 21.7      Monocytes % 6.5      Eosinophils % 1.4      Basophils % 0.4      Neutrophils Absolute 5.55      Immature Granulocytes Absolute, Automated 0.09      Lymphocytes Absolute 1.75      Monocytes Absolute 0.52      Eosinophils Absolute 0.11      Basophils Absolute 0.03     COMPREHENSIVE METABOLIC PANEL - Abnormal    Glucose 128 (*)     Sodium 138      Potassium 3.4 (*)     Chloride 105      Bicarbonate 20 (*)     Anion Gap 16      Urea Nitrogen 25 (*)      Creatinine 1.26      eGFR 69      Calcium 9.4      Albumin 4.4      Alkaline Phosphatase 61      Total Protein 7.0      AST 24      Bilirubin, Total 0.6      ALT 22     CREATINE KINASE - Abnormal    Creatine Kinase 434 (*)    URINALYSIS WITH REFLEX CULTURE AND MICROSCOPIC    Narrative:     The following orders were created for panel order Urinalysis with Reflex Culture and Microscopic.  Procedure                               Abnormality         Status                     ---------                               -----------         ------                     Urinalysis with Reflex C...[[id number]]                                                 Extra Urine Gray Tube[[id number]]                                                         Please view results for these tests on the individual orders.   URINALYSIS WITH REFLEX CULTURE AND MICROSCOPIC   EXTRA URINE GRAY TUBE     CT abdomen pelvis wo IV contrast    (Results Pending)     Medications   lidocaine 4 % patch 1 patch (1 patch transdermal Medication Applied 7/15/25 2304)   HYDROmorphone (Dilaudid) injection 1 mg (has no administration in time range)   sodium chloride 0.9 % bolus 1,000 mL (1,000 mL intravenous New Bag 7/15/25 2302)   ketorolac (Toradol) injection 15 mg (15 mg intravenous Given 7/15/25 2304)   dexAMETHasone (Decadron) injection 10 mg (10 mg intravenous Given 7/15/25 2304)   orphenadrine (Norflex) injection 60 mg (60 mg intravenous Given 7/15/25 2304)     New Prescriptions    No medications on file         Procedure  Procedures       [1] No past medical history on file.  [2]   Past Surgical History:  Procedure Laterality Date    ANTERIOR CRUCIATE LIGAMENT REPAIR Right 10/29/2014   [3] No family history on file.  [4]   Social History  Tobacco Use    Smoking status: Former     Types: Cigarettes     Start date: 1995    Smokeless tobacco: Never   Vaping Use    Vaping status: Never Used   Substance Use Topics    Alcohol use: Not Currently    Drug use: Never         Nimesh Restrepo PA-C  07/16/25 0007

## 2025-07-22 ENCOUNTER — HOSPITAL ENCOUNTER (OUTPATIENT)
Dept: RADIOLOGY | Facility: CLINIC | Age: 50
Discharge: HOME | End: 2025-07-22
Payer: COMMERCIAL

## 2025-07-22 ENCOUNTER — APPOINTMENT (OUTPATIENT)
Dept: PRIMARY CARE | Facility: CLINIC | Age: 50
End: 2025-07-22
Payer: COMMERCIAL

## 2025-07-22 VITALS
DIASTOLIC BLOOD PRESSURE: 80 MMHG | WEIGHT: 230 LBS | BODY MASS INDEX: 31.19 KG/M2 | OXYGEN SATURATION: 98 % | RESPIRATION RATE: 18 BRPM | SYSTOLIC BLOOD PRESSURE: 128 MMHG | HEART RATE: 90 BPM

## 2025-07-22 DIAGNOSIS — M54.41 ACUTE MIDLINE LOW BACK PAIN WITH RIGHT-SIDED SCIATICA: ICD-10-CM

## 2025-07-22 DIAGNOSIS — M54.10 RADICULITIS: ICD-10-CM

## 2025-07-22 DIAGNOSIS — M54.41 ACUTE MIDLINE LOW BACK PAIN WITH RIGHT-SIDED SCIATICA: Primary | ICD-10-CM

## 2025-07-22 PROCEDURE — 72110 X-RAY EXAM L-2 SPINE 4/>VWS: CPT | Performed by: RADIOLOGY

## 2025-07-22 PROCEDURE — 99214 OFFICE O/P EST MOD 30 MIN: CPT | Performed by: FAMILY MEDICINE

## 2025-07-22 PROCEDURE — 72110 X-RAY EXAM L-2 SPINE 4/>VWS: CPT

## 2025-07-22 RX ORDER — GABAPENTIN 300 MG/1
300 CAPSULE ORAL 3 TIMES DAILY
Qty: 90 CAPSULE | Refills: 0 | Status: SHIPPED | OUTPATIENT
Start: 2025-07-22 | End: 2025-08-21

## 2025-07-22 RX ORDER — OXYCODONE AND ACETAMINOPHEN 5; 325 MG/1; MG/1
1 TABLET ORAL EVERY 6 HOURS PRN
Qty: 28 TABLET | Refills: 0 | Status: SHIPPED | OUTPATIENT
Start: 2025-07-22 | End: 2025-07-29

## 2025-07-22 RX ORDER — PREDNISONE 10 MG/1
TABLET ORAL
Qty: 30 TABLET | Refills: 0 | Status: SHIPPED | OUTPATIENT
Start: 2025-07-22 | End: 2025-08-01

## 2025-07-22 ASSESSMENT — COLUMBIA-SUICIDE SEVERITY RATING SCALE - C-SSRS: 1. IN THE PAST MONTH, HAVE YOU WISHED YOU WERE DEAD OR WISHED YOU COULD GO TO SLEEP AND NOT WAKE UP?: NO

## 2025-07-22 ASSESSMENT — PAIN SCALES - GENERAL: PAINLEVEL_OUTOF10: 8

## 2025-07-28 NOTE — PROGRESS NOTES
Subjective   Patient ID: Balwinder Tomlinson is a 50 y.o. male who presents for Follow-up (Pt is in for hospital follow up Dx R sided back pain with R sided sciatica).    HPI  Balwinder presents complaining of intense right sided back pain that is radiating down his leg.  Approximately 3 to 4 weeks ago he started with a numb sensation in the anterolateral aspect of his right thigh.  He was able to do his activities without difficulty.  He has had some intermittent low back pain with a history of ruptured disc at L4-L5.  He developed severe back pain with radiation into his right leg and difficulty standing on it suddenly when getting out of his truck on July 15.  He was seen in the emergency room and the evaluation was reviewed today.  His symptoms have progressed.  He has to focus on lifting up his right foot when he walks.  The pain is intense in his low back with radiation into his right thigh.  The area of numbness is more intense.  No bowel or bladder issues    Review of Systems  All other systems have been reviewed and are negative except as noted in the HPI.       Objective   Vital Signs:  /80 (BP Location: Left arm, Patient Position: Sitting)   Pulse 90   Resp 18   Wt 104 kg (230 lb)   SpO2 98%   BMI 31.19 kg/m²     Physical Exam  Alert.  Moderate discomfort.  Positive straight leg raise on the right.  Negative on the left.  Strength is 4 out of 5 in his hip flexors 5 out of 5 in hip abduction and abduction.  Assessment & Plan  Acute midline low back pain with right-sided sciatica  Concern for recurrent disc herniation given the paresthesias in the right leg and weakness.  Begin with prednisone to help decrease the inflammation, gabapentin to reduce the nerve pain and oxycodone as needed for the severe pain.  He will obtain x-rays for further evaluation.  Stretching exercises discussed.  If x-rays reveal no underlying issue, recommend MRI of the LS spine given the radicular symptoms and noted muscle  weakness on exam.  Orders:    predniSONE (Deltasone) 10 mg tablet; Take 5 tablets (50 mg) by mouth once daily for 2 days, THEN 4 tablets (40 mg) once daily for 2 days, THEN 3 tablets (30 mg) once daily for 2 days, THEN 2 tablets (20 mg) once daily for 2 days, THEN 1 tablet (10 mg) once daily for 2 days.    gabapentin (Neurontin) 300 mg capsule; Take 1 capsule (300 mg) by mouth 3 times a day.    oxyCODONE-acetaminophen (Percocet) 5-325 mg tablet; Take 1 tablet by mouth every 6 hours if needed for severe pain (7 - 10) for up to 7 days.    Radiculitis    Orders:    predniSONE (Deltasone) 10 mg tablet; Take 5 tablets (50 mg) by mouth once daily for 2 days, THEN 4 tablets (40 mg) once daily for 2 days, THEN 3 tablets (30 mg) once daily for 2 days, THEN 2 tablets (20 mg) once daily for 2 days, THEN 1 tablet (10 mg) once daily for 2 days.    gabapentin (Neurontin) 300 mg capsule; Take 1 capsule (300 mg) by mouth 3 times a day.    oxyCODONE-acetaminophen (Percocet) 5-325 mg tablet; Take 1 tablet by mouth every 6 hours if needed for severe pain (7 - 10) for up to 7 days.      Follow up PRN, sooner with any problems or concerns.

## 2025-08-04 ENCOUNTER — TELEPHONE (OUTPATIENT)
Dept: PRIMARY CARE | Facility: CLINIC | Age: 50
End: 2025-08-04
Payer: COMMERCIAL

## 2025-08-08 ENCOUNTER — APPOINTMENT (OUTPATIENT)
Dept: RADIOLOGY | Facility: HOSPITAL | Age: 50
End: 2025-08-08
Payer: COMMERCIAL

## 2025-08-28 ENCOUNTER — EVALUATION (OUTPATIENT)
Facility: CLINIC | Age: 50
End: 2025-08-28
Payer: COMMERCIAL

## 2025-08-28 DIAGNOSIS — R10.2 PELVIC PAIN: ICD-10-CM

## 2025-08-28 DIAGNOSIS — M79.604 RIGHT LEG PAIN: Primary | ICD-10-CM

## 2025-08-28 PROCEDURE — 97162 PT EVAL MOD COMPLEX 30 MIN: CPT | Mod: GP | Performed by: PHYSICAL THERAPIST

## 2025-08-28 PROCEDURE — 97530 THERAPEUTIC ACTIVITIES: CPT | Mod: GP | Performed by: PHYSICAL THERAPIST

## 2025-09-05 ENCOUNTER — TREATMENT (OUTPATIENT)
Facility: CLINIC | Age: 50
End: 2025-09-05
Payer: COMMERCIAL

## 2025-09-05 DIAGNOSIS — R10.2 PELVIC PAIN: ICD-10-CM

## 2025-09-05 DIAGNOSIS — M79.604 RIGHT LEG PAIN: ICD-10-CM

## 2025-09-05 PROCEDURE — 97110 THERAPEUTIC EXERCISES: CPT | Mod: GP

## 2025-09-05 PROCEDURE — 97530 THERAPEUTIC ACTIVITIES: CPT | Mod: GP

## 2025-10-10 ENCOUNTER — APPOINTMENT (OUTPATIENT)
Dept: PRIMARY CARE | Facility: CLINIC | Age: 50
End: 2025-10-10
Payer: COMMERCIAL

## 2025-10-13 ENCOUNTER — APPOINTMENT (OUTPATIENT)
Dept: PRIMARY CARE | Facility: CLINIC | Age: 50
End: 2025-10-13
Payer: COMMERCIAL